# Patient Record
Sex: FEMALE | Race: WHITE | NOT HISPANIC OR LATINO | Employment: UNEMPLOYED | URBAN - NONMETROPOLITAN AREA
[De-identification: names, ages, dates, MRNs, and addresses within clinical notes are randomized per-mention and may not be internally consistent; named-entity substitution may affect disease eponyms.]

---

## 2020-08-07 ENCOUNTER — APPOINTMENT (OUTPATIENT)
Dept: ULTRASOUND IMAGING | Facility: HOSPITAL | Age: 25
End: 2020-08-07

## 2020-08-07 ENCOUNTER — HOSPITAL ENCOUNTER (INPATIENT)
Facility: HOSPITAL | Age: 25
LOS: 1 days | Discharge: LEFT BEFORE DISCHARGE | End: 2020-08-08
Attending: OBSTETRICS & GYNECOLOGY | Admitting: OBSTETRICS & GYNECOLOGY

## 2020-08-07 DIAGNOSIS — F19.10 SUBSTANCE ABUSE (HCC): ICD-10-CM

## 2020-08-07 DIAGNOSIS — N12 PYELONEPHRITIS: Primary | ICD-10-CM

## 2020-08-07 DIAGNOSIS — Z3A.15 15 WEEKS GESTATION OF PREGNANCY: ICD-10-CM

## 2020-08-07 LAB
ALBUMIN SERPL-MCNC: 4 G/DL (ref 3.5–5.2)
ALBUMIN/GLOB SERPL: 1.4 G/DL
ALP SERPL-CCNC: 52 U/L (ref 39–117)
ALT SERPL W P-5'-P-CCNC: 15 U/L (ref 1–33)
AMPHET+METHAMPHET UR QL: POSITIVE
AMPHETAMINES UR QL: POSITIVE
AMYLASE SERPL-CCNC: 29 U/L (ref 28–100)
ANION GAP SERPL CALCULATED.3IONS-SCNC: 11 MMOL/L (ref 5–15)
AST SERPL-CCNC: 33 U/L (ref 1–32)
BACTERIA UR QL AUTO: ABNORMAL /HPF
BARBITURATES UR QL SCN: NEGATIVE
BASOPHILS # BLD AUTO: 0.06 10*3/MM3 (ref 0–0.2)
BASOPHILS NFR BLD AUTO: 0.3 % (ref 0–1.5)
BENZODIAZ UR QL SCN: NEGATIVE
BILIRUB SERPL-MCNC: 0.8 MG/DL (ref 0–1.2)
BILIRUB UR QL STRIP: NEGATIVE
BUN SERPL-MCNC: 3 MG/DL (ref 6–20)
BUN/CREAT SERPL: 6 (ref 7–25)
BUPRENORPHINE SERPL-MCNC: NEGATIVE NG/ML
CALCIUM SPEC-SCNC: 8.9 MG/DL (ref 8.6–10.5)
CANNABINOIDS SERPL QL: POSITIVE
CHLORIDE SERPL-SCNC: 101 MMOL/L (ref 98–107)
CLARITY UR: ABNORMAL
CO2 SERPL-SCNC: 24 MMOL/L (ref 22–29)
COCAINE UR QL: NEGATIVE
COLOR UR: ABNORMAL
CREAT SERPL-MCNC: 0.5 MG/DL (ref 0.57–1)
D-LACTATE SERPL-SCNC: 0.9 MMOL/L (ref 0.5–2)
DEPRECATED RDW RBC AUTO: 39 FL (ref 37–54)
EOSINOPHIL # BLD AUTO: 0.26 10*3/MM3 (ref 0–0.4)
EOSINOPHIL NFR BLD AUTO: 1.5 % (ref 0.3–6.2)
ERYTHROCYTE [DISTWIDTH] IN BLOOD BY AUTOMATED COUNT: 12.6 % (ref 12.3–15.4)
GFR SERPL CREATININE-BSD FRML MDRD: 149 ML/MIN/1.73
GFR SERPL CREATININE-BSD FRML MDRD: >150 ML/MIN/1.73
GLOBULIN UR ELPH-MCNC: 2.9 GM/DL
GLUCOSE SERPL-MCNC: 98 MG/DL (ref 65–99)
GLUCOSE UR STRIP-MCNC: NEGATIVE MG/DL
HCG INTACT+B SERPL-ACNC: NORMAL MIU/ML
HCG SERPL QL: POSITIVE
HCT VFR BLD AUTO: 36.2 % (ref 34–46.6)
HGB BLD-MCNC: 13.2 G/DL (ref 12–15.9)
HGB UR QL STRIP.AUTO: ABNORMAL
HYALINE CASTS UR QL AUTO: ABNORMAL /LPF
IMM GRANULOCYTES # BLD AUTO: 0.06 10*3/MM3 (ref 0–0.05)
IMM GRANULOCYTES NFR BLD AUTO: 0.3 % (ref 0–0.5)
KETONES UR QL STRIP: ABNORMAL
LEUKOCYTE ESTERASE UR QL STRIP.AUTO: ABNORMAL
LIPASE SERPL-CCNC: 14 U/L (ref 13–60)
LYMPHOCYTES # BLD AUTO: 3.92 10*3/MM3 (ref 0.7–3.1)
LYMPHOCYTES NFR BLD AUTO: 22.5 % (ref 19.6–45.3)
MCH RBC QN AUTO: 31.4 PG (ref 26.6–33)
MCHC RBC AUTO-ENTMCNC: 36.5 G/DL (ref 31.5–35.7)
MCV RBC AUTO: 86 FL (ref 79–97)
METHADONE UR QL SCN: NEGATIVE
MONOCYTES # BLD AUTO: 1.26 10*3/MM3 (ref 0.1–0.9)
MONOCYTES NFR BLD AUTO: 7.2 % (ref 5–12)
NEUTROPHILS NFR BLD AUTO: 11.9 10*3/MM3 (ref 1.7–7)
NEUTROPHILS NFR BLD AUTO: 68.2 % (ref 42.7–76)
NITRITE UR QL STRIP: POSITIVE
NRBC BLD AUTO-RTO: 0 /100 WBC (ref 0–0.2)
OPIATES UR QL: NEGATIVE
OXYCODONE UR QL SCN: NEGATIVE
PCP UR QL SCN: NEGATIVE
PH UR STRIP.AUTO: 6.5 [PH] (ref 5–8)
PLATELET # BLD AUTO: 326 10*3/MM3 (ref 140–450)
PMV BLD AUTO: 9.4 FL (ref 6–12)
POTASSIUM SERPL-SCNC: 3.6 MMOL/L (ref 3.5–5.2)
PROPOXYPH UR QL: NEGATIVE
PROT SERPL-MCNC: 6.9 G/DL (ref 6–8.5)
PROT UR QL STRIP: ABNORMAL
RBC # BLD AUTO: 4.21 10*6/MM3 (ref 3.77–5.28)
RBC # UR: ABNORMAL /HPF
REF LAB TEST METHOD: ABNORMAL
SARS-COV-2 RDRP RESP QL NAA+PROBE: NOT DETECTED
SODIUM SERPL-SCNC: 136 MMOL/L (ref 136–145)
SP GR UR STRIP: 1.02 (ref 1–1.03)
SQUAMOUS #/AREA URNS HPF: ABNORMAL /HPF
TRICYCLICS UR QL SCN: NEGATIVE
UROBILINOGEN UR QL STRIP: ABNORMAL
WBC # BLD AUTO: 17.46 10*3/MM3 (ref 3.4–10.8)
WBC UR QL AUTO: ABNORMAL /HPF
YEAST URNS QL MICRO: ABNORMAL /HPF

## 2020-08-07 PROCEDURE — 25010000002 CEFTRIAXONE PER 250 MG: Performed by: NURSE PRACTITIONER

## 2020-08-07 PROCEDURE — 96365 THER/PROPH/DIAG IV INF INIT: CPT

## 2020-08-07 PROCEDURE — 76775 US EXAM ABDO BACK WALL LIM: CPT

## 2020-08-07 PROCEDURE — 96375 TX/PRO/DX INJ NEW DRUG ADDON: CPT

## 2020-08-07 PROCEDURE — G0378 HOSPITAL OBSERVATION PER HR: HCPCS

## 2020-08-07 PROCEDURE — 81001 URINALYSIS AUTO W/SCOPE: CPT | Performed by: NURSE PRACTITIONER

## 2020-08-07 PROCEDURE — 87635 SARS-COV-2 COVID-19 AMP PRB: CPT | Performed by: NURSE PRACTITIONER

## 2020-08-07 PROCEDURE — 25010000002 ONDANSETRON PER 1 MG: Performed by: NURSE PRACTITIONER

## 2020-08-07 PROCEDURE — 36415 COLL VENOUS BLD VENIPUNCTURE: CPT

## 2020-08-07 PROCEDURE — 99284 EMERGENCY DEPT VISIT MOD MDM: CPT

## 2020-08-07 PROCEDURE — 80306 DRUG TEST PRSMV INSTRMNT: CPT | Performed by: NURSE PRACTITIONER

## 2020-08-07 PROCEDURE — 87040 BLOOD CULTURE FOR BACTERIA: CPT | Performed by: NURSE PRACTITIONER

## 2020-08-07 PROCEDURE — 87088 URINE BACTERIA CULTURE: CPT | Performed by: NURSE PRACTITIONER

## 2020-08-07 PROCEDURE — 83690 ASSAY OF LIPASE: CPT | Performed by: NURSE PRACTITIONER

## 2020-08-07 PROCEDURE — C9803 HOPD COVID-19 SPEC COLLECT: HCPCS

## 2020-08-07 PROCEDURE — 84702 CHORIONIC GONADOTROPIN TEST: CPT | Performed by: NURSE PRACTITIONER

## 2020-08-07 PROCEDURE — 82150 ASSAY OF AMYLASE: CPT | Performed by: NURSE PRACTITIONER

## 2020-08-07 PROCEDURE — 85025 COMPLETE CBC W/AUTO DIFF WBC: CPT | Performed by: NURSE PRACTITIONER

## 2020-08-07 PROCEDURE — 76805 OB US >/= 14 WKS SNGL FETUS: CPT

## 2020-08-07 PROCEDURE — 87186 SC STD MICRODIL/AGAR DIL: CPT | Performed by: NURSE PRACTITIONER

## 2020-08-07 PROCEDURE — 84703 CHORIONIC GONADOTROPIN ASSAY: CPT | Performed by: NURSE PRACTITIONER

## 2020-08-07 PROCEDURE — 87086 URINE CULTURE/COLONY COUNT: CPT | Performed by: NURSE PRACTITIONER

## 2020-08-07 PROCEDURE — 80053 COMPREHEN METABOLIC PANEL: CPT | Performed by: NURSE PRACTITIONER

## 2020-08-07 PROCEDURE — 83605 ASSAY OF LACTIC ACID: CPT | Performed by: NURSE PRACTITIONER

## 2020-08-07 RX ORDER — DIVALPROEX SODIUM 500 MG/1
500 TABLET, DELAYED RELEASE ORAL 3 TIMES DAILY
COMMUNITY

## 2020-08-07 RX ORDER — ALBUTEROL SULFATE 90 UG/1
2 AEROSOL, METERED RESPIRATORY (INHALATION) EVERY 4 HOURS PRN
COMMUNITY

## 2020-08-07 RX ORDER — ACETAMINOPHEN 325 MG/1
650 TABLET ORAL EVERY 6 HOURS PRN
Status: DISCONTINUED | OUTPATIENT
Start: 2020-08-07 | End: 2020-08-08 | Stop reason: HOSPADM

## 2020-08-07 RX ORDER — ONDANSETRON 2 MG/ML
4 INJECTION INTRAMUSCULAR; INTRAVENOUS ONCE
Status: COMPLETED | OUTPATIENT
Start: 2020-08-07 | End: 2020-08-07

## 2020-08-07 RX ADMIN — SODIUM CHLORIDE 1000 ML: 9 INJECTION, SOLUTION INTRAVENOUS at 15:35

## 2020-08-07 RX ADMIN — ONDANSETRON HYDROCHLORIDE 4 MG: 2 SOLUTION INTRAMUSCULAR; INTRAVENOUS at 15:35

## 2020-08-07 RX ADMIN — SODIUM CHLORIDE 1 G: 900 INJECTION INTRAVENOUS at 17:44

## 2020-08-07 RX ADMIN — ACETAMINOPHEN 650 MG: 325 TABLET, FILM COATED ORAL at 22:03

## 2020-08-07 NOTE — PHARMACY RECOMMENDATION
Pharmacy Medication Review  PREGNANCY ALERTS  WARNINGS & PRECAUTIONS      Subjective:  Zo Bal is a 24 y.o. female currently being treated for PYELONEPHRITIS.     PREGNANT: OB/GYN Status Pregnant  LELSI: 01/26/2021    **ONE OR MORE OF THE PATIENT'S HOME MEDICATIONS HAS A SIGNIFICANT WARNING FOR PREGNANCY THAT SHOULD BE REVIEWED. **    Disclaimer: the current drug therapy reported by the patient may, in fact, be most appropriate therapy decided based on physician-patient relationship and previous assessments. However, medications with significant pregnancy warnings should always be reviewed for appropriateness, as each medication can have differing effects at different stages during pregnancy. For more information, please call or consult a pharmacist.    Precaution Warnings   List of new warnings for Zo Bal   Warning Type Importance Description Order RN Override Reason Comment   Pregnancy Extreme caution KEPPRA PO: Extreme caution for Pregnancy levETIRAcetam (KEPPRA PO) [[id number]] Ordering Provider Acknowledged    Pregnancy Contraindicated divalproex: Contraindicated for Pregnancy divalproex (DEPAKOTE) 500 MG DR tablet [[id number]] Ordering Provider Acknowledged      Discussion: the following information is obtained from drug package inserts exactly as it appears. Links provided.     DIVALPROEX SODIUM - CONTRAINDICATED - BLACK BOX WARNING:   Fetal Risk   Valproate can cause major congenital malformations, particularly neural tube defects (e.g., spina bifida).  Valproate should not be administered to a woman of childbearing potential unless the drug is essential to the management of her medical condition. This is especially important when valproate use is considered for a condition not usually associated with permanent injury or death (e.g., migraine). Women should use effective contraception while using valproate [see Warnings and Precautions (5.2, 5.3)].   Pregnancy    Pregnancy Category D: [see  Warnings and Precautions (5.3)].    Pregnancy Registry   To collect information on the effects of in utero exposure to Depakote, physicians should encourage pregnant patients taking Depakote to enroll in the North American Antiepileptic Drug (NAAED) Pregnancy Registry. This can be done by calling toll free [phone], and must be done by the patients themselves. Information on the registry can  be found at the website, http://www.aedpregnancyregistry.org/.   Full information available at: https://www.accessdata.fda.gov/drugsatfda_docs/label/2011/986647e515uxb.pdf      LEVETIRACETAM - WARNING:   Pregnancy Category C   In animal studies, levetiracetam produced evidence of developmental toxicity at  doses similar to or greater than human therapeutic doses.    Administration to female rats throughout pregnancy and lactation was associated with increased incidences of minor fetal skeletal abnormalities and retarded offspring growth pre- and/or postnatally at doses ?350 mg/kg/day (approximately equivalent to the maximum recommended human dose of 3000 mg [MRHD]  on a mg/m2 basis) and with increased pup mortality and offspring behavioral alterations at a dose of 1800 mg/kg/day (6 times the MRHD on a mg/m2 basis). The developmental no effect dose was 70 mg/kg/day (0.2 times the MRHD on a mg/m2 basis). There was no overt maternal toxicity at the doses used in this study.    Treatment of pregnant rabbits during the period of organogenesis resulted in increased embryofetal mortality  and increased incidences of minor fetal skeletal abnormalities at doses ?600 mg/kg/day (approximately 4 times MRHD on a mg/m2 basis) and in decreased fetal weights and increased incidences of fetal malformations at a dose of 1800 mg/kg/day (12 times the MRHD on a mg/m2 basis). The developmental no effect dose was 200 mg/kg/day (1.3 times the MRHD on a mg/m2 basis). Maternal toxicity was also observed at 1800 mg/kg/d ay.   When pregnant rats  were treated during the period of organogenesis, fetal weights were decreased and the incidence of fetal skeletal variations was increased at a do se of 3600 mg/kg/day (12 times the MRHD). 1200 mg/kg/day (4 times the MRHD) was a developme ntal no effect dose. There was no evidence of maternal toxicity in this study.   Treatment of rats during the last third of gestation and throughout lactation produced no adverse developmental or maternal effects at doses of up to 1800 mg/kg/day (6 times the MRHD on a mg/m2 basis).   There are no adequate and well-controlled studies in pregnant women. KEPPRA should be used during pregnancy only if the potential benefit justifies the potential risk to the fetus.   Pregnancy Registries To provide information regarding the effects of  in utero exposure to KEPPRA, physicians are advised to recommend that pregnant patients taking KEPPRA enroll in the North American Antiepileptic Drug (NAAED) pregnancy registry. This can be done by calling the toll free number [phone], and must be done by the patients themselves.  Information on the registry can also be found at the website http://www.aedpregnancyregistry.org/. [employer] has established the [employer]CB AED Pregnancy Registry to advance scientific knowledge about safety and outcomes associated with pregnant women being treated with all UCB antiepileptic drugs, including KEPPRA. To ensure broad program access and reach, either a healthcare provider or the patient can initiate enrollment in the [employer] AED Pregnancy Registry by calling [phone] (toll free).   Full information available at: https://www.accessdata.fda.gov/drugsatfda_docs/label/2009/[email or web address].pdf          Darrick Paniagua, PharmD  08/07/20  6:31 PM

## 2020-08-07 NOTE — ED PROVIDER NOTES
"Subjective   Patient is a 24-year-old white female presents the emergency department with right flank pain that started\" at abimael today.\"  She states she is unsure if she noticed the pain after she had to get up and urinate or if the pain woke her up.  States the pain is been worse throughout the day.  She denies any abdominal pain.  Patient states she is unsure if she is pregnant her last menstrual period was May 7.  She has not done any home pregnancy test.  She denies any vaginal bleeding or discharge.  She denies fever or chills.  She has had nausea without vomiting.  Patient presents emergency department per [facility].      History provided by:  Patient   used: No        Review of Systems   Constitutional: Negative.    HENT: Negative.    Eyes: Negative.    Respiratory: Negative.    Cardiovascular: Negative.    Gastrointestinal: Negative.    Endocrine: Negative.    Genitourinary:        Patient is a 24-year-old white female presents the emergency department with right flank pain that started\" at abimael today.\"  She states she is unsure if she noticed the pain after she had to get up and urinate or if the pain woke her up.  States the pain is been worse throughout the day.  She denies any abdominal pain.  Patient states she is unsure if she is pregnant her last menstrual period was May 7.  She has not done any home pregnancy test.  She denies any vaginal bleeding or discharge.  She denies fever or chills.  She has had nausea without vomiting.  Patient presents emergency department per [facility].     Musculoskeletal: Negative.    Skin: Negative.    Allergic/Immunologic: Negative.    Neurological: Negative.    Hematological: Negative.    Psychiatric/Behavioral: Negative.    All other systems reviewed and are negative.      Past Medical History:   Diagnosis Date   • Seizures (CMS/HCC)        Allergies   Allergen Reactions   • Tramadol Hives       Past Surgical History: " "  Procedure Laterality Date   • TOOTH EXTRACTION         History reviewed. No pertinent family history.    Social History     Socioeconomic History   • Marital status:      Spouse name: Not on file   • Number of children: Not on file   • Years of education: Not on file   • Highest education level: Not on file   Tobacco Use   • Smoking status: Current Every Day Smoker     Packs/day: 0.50     Types: Cigarettes   Substance and Sexual Activity   • Alcohol use: Not Currently     Frequency: Never   • Drug use: Yes     Types: Marijuana     Comment: Legal in [place]       Prior to Admission medications    Not on File       /67   Pulse 55   Temp 98.1 °F (36.7 °C) (Oral)   Resp 20   Ht 167.6 cm (66\")   Wt 99.8 kg (220 lb)   LMP 05/07/2020   SpO2 91%   BMI 35.51 kg/m²     Objective   Physical Exam   Constitutional: She is oriented to person, place, and time. She appears well-developed and well-nourished.   Nontoxic-appearing   HENT:   Head: Normocephalic and atraumatic.   Eyes: Pupils are equal, round, and reactive to light. Conjunctivae and EOM are normal.   Neck: Normal range of motion. Neck supple. No tracheal deviation present. No thyromegaly present.   Cardiovascular: Normal rate, regular rhythm, normal heart sounds and intact distal pulses.   Pulmonary/Chest: Effort normal and breath sounds normal. No respiratory distress. She has no wheezes. She has no rales. She exhibits no tenderness.   Abdominal: Soft. Bowel sounds are normal.   Abdomen is soft, nondistended.  There is no tenderness noted on palpation.  She does have right CVA tenderness on percussion.   Musculoskeletal: Normal range of motion.   Neurological: She is alert and oriented to person, place, and time. She has normal reflexes. No cranial nerve deficit.   Skin: Skin is warm and dry.   Psychiatric: She has a normal mood and affect. Her behavior is normal. Judgment and thought content normal.   Nursing note and vitals " reviewed.      Procedures         Lab Results (last 24 hours)     Procedure Component Value Units Date/Time    CBC & Differential [[id number]] Collected:  08/07/20 1530    Specimen:  Blood Updated:  08/07/20 1541    Narrative:       The following orders were created for panel order CBC & Differential.  Procedure                               Abnormality         Status                     ---------                               -----------         ------                     CBC Auto Differential[[id number]]        Abnormal            Final result                 Please view results for these tests on the individual orders.    Comprehensive Metabolic Panel [[id number]]  (Abnormal) Collected:  08/07/20 1530    Specimen:  Blood Updated:  08/07/20 1558     Glucose 98 mg/dL      BUN 3 mg/dL      Creatinine 0.50 mg/dL      Sodium 136 mmol/L      Potassium 3.6 mmol/L      Chloride 101 mmol/L      CO2 24.0 mmol/L      Calcium 8.9 mg/dL      Total Protein 6.9 g/dL      Albumin 4.00 g/dL      ALT (SGPT) 15 U/L      AST (SGOT) 33 U/L      Alkaline Phosphatase 52 U/L      Total Bilirubin 0.8 mg/dL      eGFR Non African Amer 149 mL/min/1.73      eGFR  African Amer >150 mL/min/1.73      Globulin 2.9 gm/dL      A/G Ratio 1.4 g/dL      BUN/Creatinine Ratio 6.0     Anion Gap 11.0 mmol/L     Narrative:       GFR Normal >60  Chronic Kidney Disease <60  Kidney Failure <15      Lipase [[id number]]  (Normal) Collected:  08/07/20 1530    Specimen:  Blood Updated:  08/07/20 1554     Lipase 14 U/L     Amylase [[id number]]  (Normal) Collected:  08/07/20 1530    Specimen:  Blood Updated:  08/07/20 1556     Amylase 29 U/L     Lactic Acid, Plasma [[id number]]  (Normal) Collected:  08/07/20 1530    Specimen:  Blood Updated:  08/07/20 1555     Lactate 0.9 mmol/L     hCG, Serum, Qualitative [[id number]]  (Abnormal) Collected:  08/07/20 1530    Specimen:  Blood Updated:  08/07/20 1558     HCG Qualitative Positive    CBC Auto Differential [[id number]]   (Abnormal) Collected:  08/07/20 1530    Specimen:  Blood Updated:  08/07/20 1541     WBC 17.46 10*3/mm3      RBC 4.21 10*6/mm3      Hemoglobin 13.2 g/dL      Hematocrit 36.2 %      MCV 86.0 fL      MCH 31.4 pg      MCHC 36.5 g/dL      RDW 12.6 %      RDW-SD 39.0 fl      MPV 9.4 fL      Platelets 326 10*3/mm3      Neutrophil % 68.2 %      Lymphocyte % 22.5 %      Monocyte % 7.2 %      Eosinophil % 1.5 %      Basophil % 0.3 %      Immature Grans % 0.3 %      Neutrophils, Absolute 11.90 10*3/mm3      Lymphocytes, Absolute 3.92 10*3/mm3      Monocytes, Absolute 1.26 10*3/mm3      Eosinophils, Absolute 0.26 10*3/mm3      Basophils, Absolute 0.06 10*3/mm3      Immature Grans, Absolute 0.06 10*3/mm3      nRBC 0.0 /100 WBC     hCG, Quantitative, Pregnancy [[id number]] Collected:  08/07/20 1530    Specimen:  Blood Updated:  08/07/20 1709     HCG Quantitative 26,172.00 mIU/mL     Narrative:       HCG Ranges by Gestational Age    Females - non-pregnant premenopausal   </= 1mIU/mL HCG  Females - postmenopausal               </= 7mIU/mL HCG    3 Weeks         5.8 -    71.2 mIU/mL  4 Weeks         9.5 -     750 mIU/mL  5 Weeks         217 -   7,138 mIU/mL  6 Weeks         158 -  31,795 mIU/mL  7 Weeks       3,697 - 163,563 mIU/mL  8 Weeks      32,065 - 149,571 mIU/mL  9 Weeks      63,803 - 151,410 mIU/mL  10 Weeks     46,509 - 186,977 mIU/mL  12 Weeks     27,832 - 210,612 mIU/mL  14 Weeks     13,950 -  62,530 mIU/mL  15 Weeks     12,039 -  70,971 mIU/mL  16 Weeks      9,040 -  56,451 mIU/mL  17 Weeks      8,175 -  55,868 mIU/mL  18 Weeks      8,099 -  58,176 mIU/mL  Results may be falsely decreased if patient taking Biotin.      Urinalysis With Culture If Indicated - Urine, Clean Catch [[id number]]  (Abnormal) Collected:  08/07/20 1630    Specimen:  Urine, Clean Catch Updated:  08/07/20 1645     Color, UA Bremer     Appearance, UA Turbid     pH, UA 6.5     Specific Gravity, UA 1.016     Glucose, UA Negative     Ketones, UA 80 mg/dL  (3+)     Bilirubin, UA Negative     Blood, UA Large (3+)     Protein, UA >=300 mg/dL (3+)     Leuk Esterase, UA Large (3+)     Nitrite, UA Positive     Urobilinogen, UA 2.0 E.U./dL    Narrative:       Dipstick results may be inaccurate due to color interference.    Urine Drug Screen - Urine, Clean Catch [[id number]]  (Abnormal) Collected:  08/07/20 1630    Specimen:  Urine, Clean Catch Updated:  08/07/20 1648     THC, Screen, Urine Positive     Phencyclidine (PCP), Urine Negative     Cocaine Screen, Urine Negative     Methamphetamine, Ur Positive     Opiate Screen Negative     Amphetamine Screen, Urine Positive     Benzodiazepine Screen, Urine Negative     Tricyclic Antidepressants Screen Negative     Methadone Screen, Urine Negative     Barbiturates Screen, Urine Negative     Oxycodone Screen, Urine Negative     Propoxyphene Screen Negative     Buprenorphine, Screen, Urine Negative    Narrative:       Cutoff For Drugs Screened:    Amphetamines               500 ng/ml  Barbiturates               200 ng/ml  Benzodiazepines            150 ng/ml  Cocaine                    150 ng/ml  Methadone                  200 ng/ml  Opiates                    100 ng/ml  Phencyclidine               25 ng/ml  THC                            50 ng/ml  Methamphetamine            500 ng/ml  Tricyclic Antidepressants  300 ng/ml  Oxycodone                  100 ng/ml  Propoxyphene               300 ng/ml  Buprenorphine               10 ng/ml    The normal value for all drugs tested is negative. This report includes unconfirmed screening results, with the cutoff values listed, to be used for medical treatment purposes only.  Unconfirmed results must not be used for non-medical purposes such as employment or legal testing.  Clinical consideration should be applied to any drug of abuse test, particularly when unconfirmed results are used.      Urinalysis, Microscopic Only - Urine, Clean Catch [[id number]]  (Abnormal) Collected:  08/07/20 1630     Specimen:  Urine, Clean Catch Updated:  08/07/20 1651     RBC, UA 6-12 /HPF      WBC, UA Too Numerous to Count /HPF      Bacteria, UA 4+ /HPF      Squamous Epithelial Cells, UA 3-6 /HPF      Yeast, UA Large/3+ Budding Yeast /HPF      Hyaline Casts, UA None Seen /LPF      Methodology Automated Microscopy    Urine Culture - Urine, Urine, Clean Catch [[id number]] Collected:  08/07/20 1630    Specimen:  Urine, Clean Catch Updated:  08/07/20 1651    Blood Culture - Blood, Arm, Right [[id number]] Collected:  08/07/20 1739    Specimen:  Blood from Arm, Right Updated:  08/07/20 1750    Blood Culture - Blood, Arm, Right [[id number]] Collected:  08/07/20 1743    Specimen:  Blood from Arm, Right Updated:  08/07/20 1750    COVID PRE-OP / PRE-PROCEDURE SCREENING ORDER (NO ISOLATION) - Swab, Nasopharynx [[id number]] Collected:  08/07/20 1744    Specimen:  Swab from Nasopharynx Updated:  08/07/20 1812    Narrative:       The following orders were created for panel order COVID PRE-OP / PRE-PROCEDURE SCREENING ORDER (NO ISOLATION) - Swab, Nasopharynx.  Procedure                               Abnormality         Status                     ---------                               -----------         ------                     COVID-19, ABBOTT IN-HOUS...[[id number]]  Normal              Final result                 Please view results for these tests on the individual orders.    COVID-19, ABBOTT IN-HOUSE,NP Swab (NO TRANSPORT MEDIA) 2 HR TAT - Swab, Nasopharynx [[id number]]  (Normal) Collected:  08/07/20 1744    Specimen:  Swab from Nasopharynx Updated:  08/07/20 1812     COVID19 Not Detected    Narrative:       Fact sheet for providers: https://www.fda.gov/media/824551/download     Fact sheet for patients: https://www.fda.gov/media/502787/download          US Ob 14 + Weeks Single or First Gestation   Final Result       1. Living intrauterine pregnancy in the vertex position with an   estimated age by ultrasound of 15 weeks 3 days.    2.  LESLI 1/26/2021       This report was finalized on 08/07/2020 17:52 by Dr. Kurt Bassett MD.      US Renal Limited   ED Interpretation   Impression:       Negative for hydronephrosis.   This report was finalized on 08/07/2020 17:32 by Dr. Kurt Bassett MD.      Final Result   Impression:       Negative for hydronephrosis.   This report was finalized on 08/07/2020 17:32 by Dr. Kurt Bassett MD.          ED Course  ED Course as of Aug 07 1840   Fri Aug 07, 2020   1605 Patient's pregnancy test is positive.  Have ordered OB ultrasound as well as a renal ultrasound to rule out a kidney stone as well.    [CW]   1710 Pending ultrasounds at this time     [CW]   1756 Reviewed results of testing with patient.  Advised that her drug screen was positive for marijuana as well as methamphetamine.  Patient states that she has not knowingly used methamphetamine.  She states that they had bought some new marijuana from a neighbor a few days ago and she states that she knew that she felt different at the time.  She states she usually purchases her marijuana from the dispensary in [facility] however she has just moved to [place].  Advised the patient that she was also 15 weeks pregnant.  Do feel that she most likely has pyelonephritis given her right flank pain, her elevated WBCs, and her significant urinary tract infection.  Call has been placed to Dr. Trivedi's who is on-call for OB/GYN at this time.    [CW]   5000 Spoke with Dr. Childs-accepted for admission.  Asked that we put in a consult for social service due to the drug use as well.  Patient be admitted shortly in stable condition.    [CW]      ED Course User Index  [CW] Shirlene Ospina, APRN          MDM  Number of Diagnoses or Management Options  15 weeks gestation of pregnancy: new and requires workup  Pyelonephritis: new and requires workup  Substance abuse (CMS/[facility]): new and requires workup     Amount and/or Complexity of Data Reviewed  Clinical lab tests:  ordered and reviewed  Tests in the radiology section of CPT®: ordered and reviewed  Independent visualization of images, tracings, or specimens: yes    Patient Progress  Patient progress: stable      Final diagnoses:   Pyelonephritis   15 weeks gestation of pregnancy   Substance abuse (CMS/[facility])          Shirlene Ospina, APRN  08/07/20 2300

## 2020-08-08 VITALS
RESPIRATION RATE: 16 BRPM | DIASTOLIC BLOOD PRESSURE: 51 MMHG | BODY MASS INDEX: 35.36 KG/M2 | HEART RATE: 55 BPM | OXYGEN SATURATION: 99 % | TEMPERATURE: 98.1 F | HEIGHT: 66 IN | WEIGHT: 220 LBS | SYSTOLIC BLOOD PRESSURE: 98 MMHG

## 2020-08-08 LAB
BACTERIA UR QL AUTO: ABNORMAL /HPF
BASOPHILS # BLD AUTO: 0.07 10*3/MM3 (ref 0–0.2)
BASOPHILS NFR BLD AUTO: 0.5 % (ref 0–1.5)
BILIRUB UR QL STRIP: NEGATIVE
CLARITY UR: ABNORMAL
COLOR UR: YELLOW
DEPRECATED RDW RBC AUTO: 41.5 FL (ref 37–54)
EOSINOPHIL # BLD AUTO: 0.47 10*3/MM3 (ref 0–0.4)
EOSINOPHIL NFR BLD AUTO: 3.4 % (ref 0.3–6.2)
ERYTHROCYTE [DISTWIDTH] IN BLOOD BY AUTOMATED COUNT: 12.6 % (ref 12.3–15.4)
GLUCOSE UR STRIP-MCNC: NEGATIVE MG/DL
HCT VFR BLD AUTO: 31.8 % (ref 34–46.6)
HGB BLD-MCNC: 11 G/DL (ref 12–15.9)
HGB UR QL STRIP.AUTO: ABNORMAL
IMM GRANULOCYTES # BLD AUTO: 0.05 10*3/MM3 (ref 0–0.05)
IMM GRANULOCYTES NFR BLD AUTO: 0.4 % (ref 0–0.5)
KETONES UR QL STRIP: NEGATIVE
LEUKOCYTE ESTERASE UR QL STRIP.AUTO: ABNORMAL
LYMPHOCYTES # BLD AUTO: 5.52 10*3/MM3 (ref 0.7–3.1)
LYMPHOCYTES NFR BLD AUTO: 40.1 % (ref 19.6–45.3)
MCH RBC QN AUTO: 30.8 PG (ref 26.6–33)
MCHC RBC AUTO-ENTMCNC: 34.6 G/DL (ref 31.5–35.7)
MCV RBC AUTO: 89.1 FL (ref 79–97)
MONOCYTES # BLD AUTO: 1.54 10*3/MM3 (ref 0.1–0.9)
MONOCYTES NFR BLD AUTO: 11.2 % (ref 5–12)
NEUTROPHILS NFR BLD AUTO: 44.4 % (ref 42.7–76)
NEUTROPHILS NFR BLD AUTO: 6.12 10*3/MM3 (ref 1.7–7)
NITRITE UR QL STRIP: NEGATIVE
NRBC BLD AUTO-RTO: 0 /100 WBC (ref 0–0.2)
PH UR STRIP.AUTO: 6 [PH] (ref 5–8)
PLATELET # BLD AUTO: 305 10*3/MM3 (ref 140–450)
PMV BLD AUTO: 9.9 FL (ref 6–12)
PROT UR QL STRIP: NEGATIVE
RBC # BLD AUTO: 3.57 10*6/MM3 (ref 3.77–5.28)
RBC # UR: ABNORMAL /HPF
REF LAB TEST METHOD: ABNORMAL
SP GR UR STRIP: 1.01 (ref 1–1.03)
SQUAMOUS #/AREA URNS HPF: ABNORMAL /HPF
TRICHOMONAS #/AREA URNS HPF: ABNORMAL /HPF
UROBILINOGEN UR QL STRIP: ABNORMAL
WBC # BLD AUTO: 13.77 10*3/MM3 (ref 3.4–10.8)
WBC UR QL AUTO: ABNORMAL /HPF

## 2020-08-08 PROCEDURE — 25010000002 CEFTRIAXONE PER 250 MG: Performed by: OBSTETRICS & GYNECOLOGY

## 2020-08-08 PROCEDURE — G0378 HOSPITAL OBSERVATION PER HR: HCPCS

## 2020-08-08 PROCEDURE — 85025 COMPLETE CBC W/AUTO DIFF WBC: CPT | Performed by: OBSTETRICS & GYNECOLOGY

## 2020-08-08 PROCEDURE — 87086 URINE CULTURE/COLONY COUNT: CPT | Performed by: OBSTETRICS & GYNECOLOGY

## 2020-08-08 PROCEDURE — 81001 URINALYSIS AUTO W/SCOPE: CPT | Performed by: OBSTETRICS & GYNECOLOGY

## 2020-08-08 RX ADMIN — SODIUM CHLORIDE 1 G: 900 INJECTION INTRAVENOUS at 08:01

## 2020-08-08 NOTE — PLAN OF CARE
Problem: Patient Care Overview  Goal: Plan of Care Review  Outcome: Ongoing (interventions implemented as appropriate)  Flowsheets (Taken 2020)  Progress: improving  Plan of Care Reviewed With: patient  Outcome Summary: VSS, pt voiding, will send UA with next void, pt states pain has subsided since ER, tylenol given once this shift, to c/o IV abx today, repeat CBC this AM  Goal: Individualization and Mutuality  Outcome: Ongoing (interventions implemented as appropriate)  Flowsheets (Taken 2020)  Patient Specific Goals (Include Timeframe): home with infection cleared, have healthy pregnancy  Goal: Discharge Needs Assessment  Outcome: Ongoing (interventions implemented as appropriate)  Flowsheets (Taken 2020)  Concerns to be Addressed: no discharge needs identified  Readmission Within the Last 30 Days: no previous admission in last 30 days  Goal: Interprofessional Rounds/Family Conf  Outcome: Ongoing (interventions implemented as appropriate)     Problem: Urinary Tract Infection  (Obstetrics)  Goal: Signs and Symptoms of Listed Potential Problems Will be Absent, Minimized or Managed (Urinary Tract Infection )  Outcome: Ongoing (interventions implemented as appropriate)  Flowsheets (Taken 2020)  Problems Assessed (Urinary Tract Infection): all  Problems Present (UTI): infection progression; pain

## 2020-08-08 NOTE — PROGRESS NOTES
Continued Stay Note   Mary     Patient Name: Zo Bal  MRN: [id number]  Today's Date: 8/8/2020    Admit Date: 8/7/2020    Discharge Plan     Row Name 08/08/20 1559       Plan    Plan Comments  SW received consult in regards to pt being positive for methamphetamines, THC, and amphetamines. SW went to speak with pt who never returned from smoke break. Pt has left AMA. Please consult SW to speak with this pt if she returns for treatment.         Discharge Codes    No documentation.             Nayely Royal

## 2020-08-08 NOTE — NURSING NOTE
Pt returned to the hospital to get IV Removed.  Pt notified by RN that if she will have to come back to the ER if she needs any other medical treatment. Pt signed AMA papers and they are placed in chart. Helene Nguyen and Dr Childs notified.

## 2020-08-08 NOTE — NURSING NOTE
Pt notified nurse at 1428 that she was going to walk outside and smoke but would return. RN went to room at 1540 to check on patient, who was not in her room.  Had patient paged overhead and notified triage security who video recording of patient leaving the property and getting in a pickup truck.  Pt has IV access at this time. , Nayely, attempted to contact patient with no answer and then contacted emergency contact who stated that she was with him.  Social work explained that she has left AMA with IV access in her arm and will need to come back for removal or the law would be notified.  Pt claimed to be returning to the hospital in 10 minutes for removal.     Eduarda Segura RN

## 2020-08-08 NOTE — H&P
"[facility]   Zo Bal  : 1995  MRN: [id number]  CSN: [id number]    History and Physical    Subjective   Zo Bal is a 24 y.o.    who presented to er with  Pain fever and 15 week pregnant. Noted to have pyelo, also noted signs of drug abuse. She is being admitted for pyelo therapy and counselimg..    Past Medical History:   Diagnosis Date   • Seizures (CMS/HCC)      Past Surgical History:   Procedure Laterality Date   • TOOTH EXTRACTION       Social History    Tobacco Use      Smoking status: Current Every Day Smoker        Packs/day: 0.50        Types: Cigarettes      Current Facility-Administered Medications:   •  acetaminophen (TYLENOL) tablet 650 mg, 650 mg, Oral, Q6H PRN, Travis Childs MD, 650 mg at 20  •  cefTRIAXone (ROCEPHIN) 1 g/100 mL 0.9% NS (MBP), 1 g, Intravenous, Q24H, Travis Childs MD, 1 g at 20 08    Allergies   Allergen Reactions   • Tramadol Hives       Review of Systems      Objective   /49 (BP Location: Left arm, Patient Position: Lying)   Pulse 68   Temp 98.6 °F (37 °C) (Oral)   Resp 18   Ht 167.6 cm (66\")   Wt 99.8 kg (220 lb)   LMP 2020   SpO2 100%   BMI 35.51 kg/m²   General: well developed; well nourished  no acute distress. Skin with infected areas almost like needle marks in stomach area   Heart: regular rate and rhythm, S1, S2 normal, no murmur, click, rub or gallop   Lungs: breathing is unlabored   Abdomen: soft, non-tender; no masses  no umbilical or inguinal hernias are present  no hepato-splenomegaly   Pelvis:: Clinical staff was present for exam, reported clong closed.    Labs  Lab Results   Component Value Date     2020    HGB 11.0 (L) 2020    HCT 31.8 (L) 2020    WBC 13.77 (H) 2020     2020    K 3.6 2020     2020    CO2 24.0 2020    BUN 3 (L) 2020    CREATININE 0.50 (L) 2020    GLUCOSE 98 2020    ALBUMIN 4.00 2020    " CALCIUM 8.9 08/07/2020    AST 33 (H) 08/07/2020    ALT 15 08/07/2020    BILITOT 0.8 08/07/2020        Assessment   1. Pyelonephritis  2. 15 week gestation  3. Poor dentition  4. Drug abuse    The risks, benefits, and alternatives of the procedure; along with the risks of anesthesia was discussed in full with the patient and family and all questions were answered.       Plan   1. Admit for iv antibiotics    Travis Childs MD  8/8/2020  09:14

## 2020-08-08 NOTE — PROGRESS NOTES
Drug counseling done,  Pt feels better,  No nausea and or vomiting today.   Exam cva tenderness is better  ua still very abnormal.  Plan continue iv antibiotics,  Will recheck  ua in am

## 2020-08-09 LAB
BACTERIA SPEC AEROBE CULT: ABNORMAL
BACTERIA SPEC AEROBE CULT: NO GROWTH

## 2020-08-10 ENCOUNTER — TELEPHONE (OUTPATIENT)
Dept: FAMILY MEDICINE CLINIC | Facility: CLINIC | Age: 25
End: 2020-08-10

## 2020-08-10 NOTE — TELEPHONE ENCOUNTER
Pt was on  [facility] ED D/C with no PCP list from 8/7/20. I called Pt to offer her an appt to est care with one of the provider in our office. No answer, LVM for Pt to return call if she would like to sched appt.

## 2020-08-11 NOTE — DISCHARGE SUMMARY
Mary Bal  : 1995  MRN: [id number]  CSN: [id number]    Discharge Summary      Date of Admission: 2020   Date of Discharge: 2020   Discharge Diagnosis: 1. pyelonephritis   Procedures Performed:       Consults: none   Brief History: Patient is a 24 y.o. who presented to er with fever elevated white count and cva tenderness. Admitted for iv antibiotics, .   Hospital Course: Iv antibiotics given 2 days, drug counseling done   Pending Studies: cultures   Condition at discharge: rapidly improving   Discharge Medications:    Your medication list      ASK your doctor about these medications      Instructions Last Dose Given Next Dose Due   albuterol sulfate  (90 Base) MCG/ACT inhaler  Commonly known as:  PROVENTIL HFA;VENTOLIN HFA;PROAIR HFA      Inhale 2 puffs Every 4 (Four) Hours As Needed for Wheezing.       divalproex 500 MG DR tablet  Commonly known as:  DEPAKOTE      Take 500 mg by mouth 3 (Three) Times a Day.       KEPPRA PO      Take  by mouth.             Discharge Disposition: pt left [facility]   Follow-up: No future appointments.         This note has been electronically signed.    Travis Childs MD  2020

## 2020-08-12 LAB
BACTERIA SPEC AEROBE CULT: NORMAL
BACTERIA SPEC AEROBE CULT: NORMAL

## 2020-08-22 ENCOUNTER — HOSPITAL ENCOUNTER (EMERGENCY)
Facility: HOSPITAL | Age: 25
Discharge: LEFT BEFORE DISCHARGE | End: 2020-08-22

## 2020-08-22 VITALS — WEIGHT: 217 LBS | BODY MASS INDEX: 34.87 KG/M2 | HEIGHT: 66 IN
